# Patient Record
Sex: FEMALE | Race: WHITE | NOT HISPANIC OR LATINO | URBAN - NONMETROPOLITAN AREA
[De-identification: names, ages, dates, MRNs, and addresses within clinical notes are randomized per-mention and may not be internally consistent; named-entity substitution may affect disease eponyms.]

---

## 2018-10-22 NOTE — PATIENT DISCUSSION
CATARACTS, OU - VISUALLY SIGNIFICANT. PT TO CALL WHEN READY TO PROCEED WITH SURGERY. SCHEDULE IF VISUAL SYMPTOMS PERSIST.

## 2018-10-22 NOTE — PATIENT DISCUSSION
POSTERIOR VITREOUS DETACHMENT, ou: NO RETINAL HOLES OR TEARS ON SLIT LAMP AND INDIRECT OPHTHALMOSCOPY. RD PRECAUTIONS DISCUSSED. CALLBACK INSTRUCTIONS GIVEN. RETURN FOR FOLLOW-UP AS SCHEDULED.

## 2019-10-25 NOTE — PATIENT DISCUSSION
MACULAR DRUSEN, OD:  PRESCRIBE AREDS 2 VITAMINS AND INCREASE UV PROTECTION. RETURN FOR FOLLOW-UP AS SCHEDULED.

## 2019-10-25 NOTE — PATIENT DISCUSSION
MEIBOMIAN GLAND DYSFUNCTION, OU: PRESCRIBE WARM COMPRESSES AND EYELID SCRUBS QD-BID, ARTIFICIAL TEARS BID-QID, THE DAILY INTAKE OF OMEGA-3 FATTY ACIDS RETURN FOR FOLLOW-UP AS SCHEDULED.

## 2020-08-31 ENCOUNTER — IMPORTED ENCOUNTER (OUTPATIENT)
Dept: URBAN - NONMETROPOLITAN AREA CLINIC 1 | Facility: CLINIC | Age: 40
End: 2020-08-31

## 2020-08-31 PROCEDURE — 92015 DETERMINE REFRACTIVE STATE: CPT

## 2020-08-31 PROCEDURE — 92004 COMPRE OPH EXAM NEW PT 1/>: CPT

## 2020-08-31 NOTE — PATIENT DISCUSSION
"""Myopia-Discussed diagnosis with patient. -Explained that people who are myopic are at a higher risk for developing RD/RT and reviewed associated S&S.-Pt to contact our office if symptoms develop. Updated spec Rx given. Recommend lens that will provide comfort as well as protect safety and health of eyes. "

## 2020-11-16 NOTE — PATIENT DISCUSSION
MODERATE DRY EYES: PRESCRIBED PRESERVATIVE FREE ARTIFICIAL TEARS 4-6X A DAY, OU AND THE DAILY INTAKE OF OMEGA-3 DHA/EPA FATTY ACIDS TO HELP RELIEVE SYMPTOMS. ADD NIGHTLY LUBRICATING OINTMENT OR GEL. Strong peripheral pulses/Capillary refill less/equal to 2 seconds

## 2020-11-16 NOTE — PATIENT DISCUSSION
Macular Drusen Counseling: I have explained the diagnosis of macular drusen and the role of drusen in development of macular degeneration. AREDS 2 multivitamin may have some benefit, as well as UV protection when outdoors and a nutritious diet, especially green leafy vegetables and fish to minimize the risk of developing macular degeneration. The patient was advised of the importance of annual dilated eye exams. Return for follow-up as scheduled.

## 2020-11-16 NOTE — PATIENT DISCUSSION
Considering Surgery Counseling: I have discussed the option of scheduling surgery versus following, as well as the risks, benefits and alternatives of cataract surgery with the patient. It was explained that the surgery is elective, there is no rush and there is no harm in waiting to have surgery. It was also explained that there is no guarantee that removing the cataract will improve their vision. The patient understands and desires to follow for now and will consider his/her options. HOCM (hypertrophic obstructive cardiomyopathy)

## 2022-04-10 ASSESSMENT — VISUAL ACUITY
OD_SC: 20/30
OS_SC: 20/20
OS_CC: J1
OD_CC: J1

## 2022-04-10 ASSESSMENT — TONOMETRY
OD_IOP_MMHG: 11
OS_IOP_MMHG: 11

## 2022-05-23 NOTE — PATIENT DISCUSSION
VISUALLY SIGNIFICANT: Increased trouble driving and reading. I have discussed the option of glasses versus cataract surgery versus following. It was explained that when the patients vision no longer meets their visual needs and a glasses prescription does not improve visual symptoms, the option of cataract surgery is a reasonable next step. It was explained that there is no guarantee that removing the cataract will improve their visual symptoms, however; it is believed that the cataract is contributing to the patient's visual impairment and surgery may improve both the visual and functional status of the patient. The risks, benefits and alternatives of surgery were discussed with the patient. After this discussion, the patient desires to proceed with cataract surgery with implantation of an intraocular lens to improve vision.

## 2023-06-09 ENCOUNTER — COMPREHENSIVE EXAM (OUTPATIENT)
Dept: RURAL CLINIC 1 | Facility: CLINIC | Age: 43
End: 2023-06-09

## 2023-06-09 DIAGNOSIS — H52.13: ICD-10-CM

## 2023-06-09 PROCEDURE — 92015 DETERMINE REFRACTIVE STATE: CPT

## 2023-06-09 PROCEDURE — 92014 COMPRE OPH EXAM EST PT 1/>: CPT

## 2023-06-09 PROCEDURE — 92310-E CONTACT LENS FITTING ESTABLISH PATIENT

## 2023-06-09 ASSESSMENT — VISUAL ACUITY
OD_CC: 20/20-2
OU_CC: 20/20
OS_CC: 20/20-1

## 2023-06-09 ASSESSMENT — TONOMETRY
OS_IOP_MMHG: 13
OD_IOP_MMHG: 13

## 2024-05-10 ENCOUNTER — COMPREHENSIVE EXAM (OUTPATIENT)
Dept: RURAL CLINIC 1 | Facility: CLINIC | Age: 44
End: 2024-05-10

## 2024-05-10 DIAGNOSIS — H52.13: ICD-10-CM

## 2024-05-10 PROCEDURE — 92015 DETERMINE REFRACTIVE STATE: CPT

## 2024-05-10 PROCEDURE — 92310-E CONTACT LENS FITTING ESTABLISH PATIENT

## 2024-05-10 PROCEDURE — 92014 COMPRE OPH EXAM EST PT 1/>: CPT

## 2024-05-10 ASSESSMENT — VISUAL ACUITY
OD_CC: 20/20
OS_CC: 20/20
OU_CC: 20/20

## 2024-05-10 ASSESSMENT — TONOMETRY
OS_IOP_MMHG: 14
OD_IOP_MMHG: 14